# Patient Record
Sex: FEMALE | Race: WHITE | Employment: STUDENT | URBAN - METROPOLITAN AREA
[De-identification: names, ages, dates, MRNs, and addresses within clinical notes are randomized per-mention and may not be internally consistent; named-entity substitution may affect disease eponyms.]

---
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
## 2021-05-03 ENCOUNTER — WALK IN (OUTPATIENT)
Dept: URGENT CARE | Age: 21
End: 2021-05-03

## 2021-05-03 VITALS — TEMPERATURE: 98.5 F

## 2021-05-03 DIAGNOSIS — Z11.1 SCREENING-PULMONARY TB: Primary | ICD-10-CM

## 2021-05-03 PROCEDURE — 86580 TB INTRADERMAL TEST: CPT | Performed by: OBSTETRICS & GYNECOLOGY

## 2021-05-04 ENCOUNTER — TELEPHONE (OUTPATIENT)
Dept: SCHEDULING | Age: 21
End: 2021-05-04

## 2021-05-06 ENCOUNTER — WALK IN (OUTPATIENT)
Dept: URGENT CARE | Age: 21
End: 2021-05-06

## 2021-05-06 DIAGNOSIS — Z11.1 ENCOUNTER FOR PPD SKIN TEST READING: Primary | ICD-10-CM

## 2021-05-06 LAB
INDURATION: 0 MM (ref 0–?)
SKIN TEST RESULT: NEGATIVE

## 2021-05-06 PROCEDURE — X1094 NO CHARGE VISIT: HCPCS | Performed by: NURSE PRACTITIONER

## (undated) NOTE — ED AVS SNAPSHOT
Piotr Talley   MRN: [id number]    Department:  [facility] Emergency Department   Date of Visit:  2/1/2018           Disclosure     Insurance plans vary and the physician(s) referred by the ER may not be covered by your plan.  Please contact you tell this physician (or your personal doctor if your instructions are to return to your personal doctor) about any new or lasting problems. The primary care or specialist physician will see patients referred from the [facility] Emergency Department.  Elver Strange